# Patient Record
Sex: FEMALE | Race: WHITE | NOT HISPANIC OR LATINO | URBAN - METROPOLITAN AREA
[De-identification: names, ages, dates, MRNs, and addresses within clinical notes are randomized per-mention and may not be internally consistent; named-entity substitution may affect disease eponyms.]

---

## 2020-06-30 ENCOUNTER — OFFICE VISIT (OUTPATIENT)
Dept: URBAN - METROPOLITAN AREA CLINIC 48 | Facility: CLINIC | Age: 65
End: 2020-06-30
Payer: MEDICARE

## 2020-06-30 DIAGNOSIS — H26.491 OTHER SECONDARY CATARACT, RIGHT EYE: Primary | ICD-10-CM

## 2020-06-30 PROCEDURE — 92004 COMPRE OPH EXAM NEW PT 1/>: CPT | Performed by: OPHTHALMOLOGY

## 2020-06-30 ASSESSMENT — INTRAOCULAR PRESSURE
OD: 9
OS: 10

## 2020-06-30 NOTE — IMPRESSION/PLAN
Impression: Other secondary cataract, right eye: H26.491. R/B/A discussed Plan: Superior dislocation OD Schedule YAG OD Patient asked that OS not be dilated.   Patient to continue spectacles and screenings with Dr. Damaris Lou after YAG OD.

## 2020-09-17 ENCOUNTER — SURGERY (OUTPATIENT)
Dept: URBAN - METROPOLITAN AREA SURGERY 26 | Facility: SURGERY | Age: 65
End: 2020-09-17
Payer: MEDICARE

## 2020-09-17 PROCEDURE — 66821 AFTER CATARACT LASER SURGERY: CPT | Performed by: OPHTHALMOLOGY

## 2020-09-24 ENCOUNTER — POST-OPERATIVE VISIT (OUTPATIENT)
Dept: URBAN - METROPOLITAN AREA CLINIC 48 | Facility: CLINIC | Age: 65
End: 2020-09-24
Payer: MEDICARE

## 2020-09-24 DIAGNOSIS — Z48.810 ENCOUNTER FOR SURGICAL AFTERCARE FOLLOWING SURGERY ON A SENSE ORGAN: Primary | ICD-10-CM

## 2020-09-24 PROCEDURE — 99024 POSTOP FOLLOW-UP VISIT: CPT | Performed by: OPHTHALMOLOGY

## 2020-09-24 ASSESSMENT — INTRAOCULAR PRESSURE: OD: 13

## 2020-09-24 NOTE — IMPRESSION/PLAN
Impression: S/P YAG PC OD - 7 Days. Encounter for surgical aftercare following surgery on a sense organ  Z48.810. Patent may return to the care with Dr. Laci Miller. may return to clinic as desired by Dr. Laci Miller RTC 2 years [place] for Vitreous degeneration ( long exam) Plan: